# Patient Record
Sex: FEMALE | Race: WHITE | Employment: PART TIME | URBAN - METROPOLITAN AREA
[De-identification: names, ages, dates, MRNs, and addresses within clinical notes are randomized per-mention and may not be internally consistent; named-entity substitution may affect disease eponyms.]

---

## 2018-09-05 ENCOUNTER — OFFICE VISIT (OUTPATIENT)
Dept: URGENT CARE | Facility: URGENT CARE | Age: 18
End: 2018-09-05

## 2018-09-05 VITALS
TEMPERATURE: 97.7 F | HEART RATE: 76 BPM | DIASTOLIC BLOOD PRESSURE: 68 MMHG | SYSTOLIC BLOOD PRESSURE: 109 MMHG | WEIGHT: 148 LBS

## 2018-09-05 DIAGNOSIS — J02.0 STREP THROAT: Primary | ICD-10-CM

## 2018-09-05 DIAGNOSIS — R07.0 THROAT PAIN: ICD-10-CM

## 2018-09-05 LAB
DEPRECATED S PYO AG THROAT QL EIA: ABNORMAL
SPECIMEN SOURCE: ABNORMAL

## 2018-09-05 PROCEDURE — 99213 OFFICE O/P EST LOW 20 MIN: CPT | Performed by: PHYSICIAN ASSISTANT

## 2018-09-05 PROCEDURE — 87880 STREP A ASSAY W/OPTIC: CPT | Performed by: FAMILY MEDICINE

## 2018-09-05 RX ORDER — IBUPROFEN 200 MG
400 TABLET ORAL PRN
COMMUNITY

## 2018-09-05 RX ORDER — AMOXICILLIN 875 MG
875 TABLET ORAL 2 TIMES DAILY
Qty: 20 TABLET | Refills: 0 | Status: SHIPPED | OUTPATIENT
Start: 2018-09-05

## 2018-09-05 NOTE — PATIENT INSTRUCTIONS
Pharyngitis: Strep (Confirmed)    You have had a positive test for strep throat. Strep throat is a contagious illness. It is spread by coughing, kissing or by touching others after touching your mouth or nose. Symptoms include throat pain that is worse with swallowing, aching all over, headache, and fever. It is treated with antibiotic medicine. This should help you start to feel better in 1 to 2 days.  Home care    Rest at home. Drink plenty of fluids to you won't get dehydrated.    No work or school for the first 2 days of taking the antibiotics. After this time, you will not be contagious. You can then return to school or work if you are feeling better.     Take antibiotic medicine for the full 10 days, even if you feel better. This is very important to ensure the infection is treated. It is also important to prevent medicine-resistant germs from developing. If you were given an antibiotic shot, you don't need any more antibiotics.    You may use acetaminophen or ibuprofen to control pain or fever, unless another medicine was prescribed for this. Talk with your healthcare provider before taking these medicines if you have chronic liver or kidney disease. Also talk with your healthcare provider if you have had a stomach ulcer or GI bleeding.    Throat lozenges or sprays help reduce pain. Gargling with warm saltwater will also reduce throat pain. Dissolve 1/2 teaspoon of salt in 1 glass of warm water. This may be useful just before meals.     Soft foods are OK. Don't eat salty or spicy foods.  Follow-up care  Follow up with your healthcare provider or our staff if you don't get better over the next week.  When to seek medical advice  Call your healthcare provider right away if any of these occur:    Fever of 100.4 F (38 C) or higher, or as directed by your healthcare provider    New or worsening ear pain, sinus pain, or headache    Painful lumps in the back of neck    Stiff neck    Lymph nodes getting larger or  becoming soft in the middle    You can't swallow liquids or you can't open your mouth wide because of throat pain    Signs of dehydration. These include very dark urine or no urine, sunken eyes, and dizziness.    Trouble breathing or noisy breathing    Muffled voice    Rash  Prevention  Here are steps you can take to help prevent an infection:    Keep good hand washing habits.    Don t have close contact with people who have sore throats, colds, or other upper respiratory infections.    Don t smoke, and stay away from secondhand smoke.  Date Last Reviewed: 11/1/2017 2000-2017 [employer]. [address] [place] [address]. All rights reserved. This information is not intended as a substitute for professional medical care. Always follow your healthcare professional's instructions.

## 2018-09-05 NOTE — MR AVS SNAPSHOT
After Visit Summary   9/5/2018    Beba Melendez    MRN: [id number]           Patient Information     Date Of Birth          2000        Visit Information        Provider Department      9/5/2018 10:05 AM Genie Holloway PA-C [facility]        Today's Diagnoses     Strep throat    -  1    Throat pain          Care Instructions      Pharyngitis: Strep (Confirmed)    You have had a positive test for strep throat. Strep throat is a contagious illness. It is spread by coughing, kissing or by touching others after touching your mouth or nose. Symptoms include throat pain that is worse with swallowing, aching all over, headache, and fever. It is treated with antibiotic medicine. This should help you start to feel better in 1 to 2 days.  Home care    Rest at home. Drink plenty of fluids to you won't get dehydrated.    No work or school for the first 2 days of taking the antibiotics. After this time, you will not be contagious. You can then return to school or work if you are feeling better.     Take antibiotic medicine for the full 10 days, even if you feel better. This is very important to ensure the infection is treated. It is also important to prevent medicine-resistant germs from developing. If you were given an antibiotic shot, you don't need any more antibiotics.    You may use acetaminophen or ibuprofen to control pain or fever, unless another medicine was prescribed for this. Talk with your healthcare provider before taking these medicines if you have chronic liver or kidney disease. Also talk with your healthcare provider if you have had a stomach ulcer or GI bleeding.    Throat lozenges or sprays help reduce pain. Gargling with warm saltwater will also reduce throat pain. Dissolve 1/2 teaspoon of salt in 1 glass of warm water. This may be useful just before meals.     Soft foods are OK. Don't eat salty or spicy foods.  Follow-up care  Follow up with  your healthcare provider or our staff if you don't get better over the next week.  When to seek medical advice  Call your healthcare provider right away if any of these occur:    Fever of 100.4 F (38 C) or higher, or as directed by your healthcare provider    New or worsening ear pain, sinus pain, or headache    Painful lumps in the back of neck    Stiff neck    Lymph nodes getting larger or becoming soft in the middle    You can't swallow liquids or you can't open your mouth wide because of throat pain    Signs of dehydration. These include very dark urine or no urine, sunken eyes, and dizziness.    Trouble breathing or noisy breathing    Muffled voice    Rash  Prevention  Here are steps you can take to help prevent an infection:    Keep good hand washing habits.    Don t have close contact with people who have sore throats, colds, or other upper respiratory infections.    Don t smoke, and stay away from secondhand smoke.  Date Last Reviewed: 11/1/2017 2000-2017 [employer]. [address] [place] [address]. All rights reserved. This information is not intended as a substitute for professional medical care. Always follow your healthcare professional's instructions.                Follow-ups after your visit        Who to contact     If you have questions or need follow up information about today's clinic visit or your schedule please contact [facility] directly at [phone].  Normal or non-critical lab and imaging results will be communicated to you by MyChart, letter or phone within 4 business days after the clinic has received the results. If you do not hear from us within 7 days, please contact the clinic through MyChart or phone. If you have a critical or abnormal lab result, we will notify you by phone as soon as possible.  Submit refill requests through [employer] or call your pharmacy and they will forward the refill request to us. Please allow 3  "business days for your refill to be completed.          Additional Information About Your Visit        [employer] Information     [employer]-Blink lets you send messages to your doctor, view your test results, renew your prescriptions, schedule appointments and more. To sign up, go to [email or web address] . Click on \"Log in\" on the left side of the screen, which will take you to the Welcome page. Then click on \"Sign up Now\" on the right side of the page.     You will be asked to enter the access code listed below, as well as some personal information. Please follow the directions to create your username and password.     Your access code is: [email or web address]  Expires: 2018 11:18 AM     Your access code will  in 90 days. If you need help or a new code, please call your [facility] clinic or [phone].        Care EveryWhere ID     This is your Care EveryWhere ID. This could be used by other organizations to access your [facility] medical records  [id number]        Your Vitals Were     Pulse Temperature                76 97.7  F (36.5  C) (Oral)           Blood Pressure from Last 3 Encounters:   18 109/68   16 130/70   14 111/68    Weight from Last 3 Encounters:   18 148 lb (67.1 kg)   16 134 lb (60.8 kg) (67 %)*   14 135 lb 2.3 oz (61.3 kg) (76 %)*     * Growth percentiles are based on [facility] 2-20 Years data.              We Performed the Following     Strep, Rapid Screen          Today's Medication Changes          These changes are accurate as of 18 11:18 AM.  If you have any questions, ask your nurse or doctor.               Start taking these medicines.        Dose/Directions    amoxicillin 875 MG tablet   Commonly known as:  AMOXIL   Used for:  Strep throat   Started by:  Genie Holloway PA-C        Dose:  875 mg   Take 1 tablet (875 mg) by mouth 2 times daily   Quantity:  20 tablet   Refills:  0         These medicines have changed or have updated prescriptions.     "    Dose/Directions    ibuprofen 200 MG tablet   Commonly known as:  ADVIL/MOTRIN   This may have changed:  Another medication with the same name was removed. Continue taking this medication, and follow the directions you see here.   Changed by:  Genie Holloway PA-C        Dose:  400 mg   Take 400 mg by mouth as needed for mild pain   Refills:  0         Stop taking these medicines if you haven't already. Please contact your care team if you have questions.     cyclobenzaprine 5 MG tablet   Commonly known as:  FLEXERIL   Stopped by:  Genie Holloway PA-C           TYLENOL 8 HOUR PO   Stopped by:  Genie Holloway PA-C                Where to get your medicines      These medications were sent to [facility] [id number] [place] [address] [address] AT [facility] [address]  [address], [facility] [address]     Phone:  [phone]     amoxicillin 875 MG tablet                Primary Care Provider Fax #    Physician No Ref-Primary [phone]       No address on file        Equal Access to Services     [facility]: Hadii cintia ku hadasho Somariya, waaxda luqadaha, qaybta kaalmada adeegyadeborah, alyssa segura . So [facility] [phone].    ATENCIÓN: Si habla español, tiene a hendrix disposición servicios gratuitos de asistencia lingüística. Llame al [phone].    We comply with applicable federal civil rights laws and [place] laws. We do not discriminate on the basis of race, color, national origin, age, disability, sex, sexual orientation, or gender identity.            Thank you!     Thank you for choosing [facility]  for your care. Our goal is always to provide you with excellent care. Hearing back from our patients is one way we can continue to improve our services. Please take a few minutes to complete the written survey that you may receive in the mail after your visit with us. Thank you!             Your  Updated Medication List - Protect others around you: Learn how to safely use, store and throw away your medicines at www.disposemymeds.org.          This list is accurate as of 9/5/18 11:18 AM.  Always use your most recent med list.                   Brand Name Dispense Instructions for use Diagnosis    amoxicillin 875 MG tablet    AMOXIL    20 tablet    Take 1 tablet (875 mg) by mouth 2 times daily    Strep throat       ibuprofen 200 MG tablet    ADVIL/MOTRIN     Take 400 mg by mouth as needed for mild pain

## 2018-09-05 NOTE — LETTER
[facility]  [address] [address]  [phone]      September 5, 2018    RE:  Beba Melendez                                                                                                                                                       [address]  [facility] [place] [facility] [address]            To whom it may concern:    Beba Melendez was seen in clinic today and was diagnosed with Strep Throat.  She is considered contagious for the first 24 hours of the antibiotic.          Sincerely,        Genie Gr [facility]

## 2018-09-05 NOTE — PROGRESS NOTES
SUBJECTIVE:   Beba Melendez is a 18 year old female presenting with a chief complaint of   1) sore throat x 2 days  2) fever since yesterday up to 102.  Took ibuprofen prior to arrival in clinic today.   3) ear pain since yesterday.     Onset of symptoms was as above.  Course of illness is worsening.    Severity moderate  Current and Associated symptoms: as per HPI  Treatment measures tried include ibuprofen.  Predisposing factors include None.    No past medical history on file.     Denies any significant PMH    There is no problem list on file for this patient.    Social History   Substance Use Topics     Smoking status: Never Smoker     Smokeless tobacco: Never Used     Alcohol use Not on file       ROS:  CONSTITUTIONAL:as per HPI  INTEGUMENTARY/SKIN: NEGATIVE for worrisome rashes, moles or lesions  EYES: NEGATIVE for vision changes or irritation  ENT/MOUTH: as per HPI  RESP:NEGATIVE for significant cough or SOB  CV: NEGATIVE for chest pain, palpitations or peripheral edema  GI: NEGATIVE for nausea, abdominal pain, heartburn, or change in bowel habits  MUSCULOSKELETAL: NEGATIVE for significant arthralgias or myalgia  NEURO: NEGATIVE for weakness, dizziness or paresthesias  Review of systems negative except as stated above.    OBJECTIVE  :/68  Pulse 76  Temp 97.7  F (36.5  C) (Oral)  Wt 148 lb (67.1 kg)  GENERAL APPEARANCE: healthy, alert and no distress  EYES: EOMI,  PERRL, conjunctiva clear  HENT: ear canals and TM's normal.  Nose and mouth without ulcers, erythema or lesions  NECK: supple, nontender, no lymphadenopathy  RESP: lungs clear to auscultation - no rales, rhonchi or wheezes  CV: regular rates and rhythm, normal S1 S2, no murmur noted  ABDOMEN:  soft, nontender, no HSM or masses and bowel sounds normal  NEURO: Normal strength and tone, sensory exam grossly normal,  normal speech and mentation  SKIN: no suspicious lesions or rashes    (J02.0) Strep throat  (primary encounter  diagnosis)  Comment:   Plan: amoxicillin (AMOXIL) 875 MG tablet        Tylenol or ibuprofen prn.  Considered contagious for the first 24 hours of antibiotic.      (R07.0) Throat pain  Comment:   Plan: Strep, Rapid Screen

## 2019-04-17 ENCOUNTER — HOSPITAL ENCOUNTER (EMERGENCY)
Facility: CLINIC | Age: 19
Discharge: HOME | End: 2019-04-18
Attending: EMERGENCY MEDICINE | Admitting: EMERGENCY MEDICINE
Payer: OTHER MISCELLANEOUS

## 2019-04-17 VITALS
BODY MASS INDEX: 24.99 KG/M2 | SYSTOLIC BLOOD PRESSURE: 134 MMHG | WEIGHT: 150 LBS | OXYGEN SATURATION: 100 % | DIASTOLIC BLOOD PRESSURE: 89 MMHG | TEMPERATURE: 97.7 F | RESPIRATION RATE: 16 BRPM | HEIGHT: 65 IN

## 2019-04-17 DIAGNOSIS — S61.011A LACERATION OF RIGHT THUMB WITHOUT FOREIGN BODY WITHOUT DAMAGE TO NAIL, INITIAL ENCOUNTER: ICD-10-CM

## 2019-04-17 PROCEDURE — 90471 IMMUNIZATION ADMIN: CPT

## 2019-04-17 PROCEDURE — 12001 RPR S/N/AX/GEN/TRNK 2.5CM/<: CPT

## 2019-04-17 PROCEDURE — 99283 EMERGENCY DEPT VISIT LOW MDM: CPT | Mod: 25

## 2019-04-17 ASSESSMENT — MIFFLIN-ST. JEOR: SCORE: 1451.28

## 2019-04-17 NOTE — ED AVS SNAPSHOT
Emergency Department  [address] [place] [address]  Phone:  [phone]  Fax:  [phone]                                    Beba Melendez   MRN: [id number]    Department:   Emergency Department   Date of Visit:  4/17/2019           After Visit Summary Signature Page    I have received my discharge instructions, and my questions have been answered. I have discussed any challenges I see with this plan with the nurse or doctor.    ..........................................................................................................................................  Patient/Patient Representative Signature      ..........................................................................................................................................  Patient Representative Print Name and Relationship to Patient    ..................................................               ................................................  Date                                   Time    ..........................................................................................................................................  Reviewed by Signature/Title    ...................................................              ..............................................  Date                                               Time          22EPIC Rev 08/18

## 2019-04-18 PROCEDURE — 25000128 H RX IP 250 OP 636: Performed by: EMERGENCY MEDICINE

## 2019-04-18 PROCEDURE — 90715 TDAP VACCINE 7 YRS/> IM: CPT | Performed by: EMERGENCY MEDICINE

## 2019-04-18 RX ADMIN — CLOSTRIDIUM TETANI TOXOID ANTIGEN (FORMALDEHYDE INACTIVATED), CORYNEBACTERIUM DIPHTHERIAE TOXOID ANTIGEN (FORMALDEHYDE INACTIVATED), BORDETELLA PERTUSSIS TOXOID ANTIGEN (GLUTARALDEHYDE INACTIVATED), BORDETELLA PERTUSSIS FILAMENTOUS HEMAGGLUTININ ANTIGEN (FORMALDEHYDE INACTIVATED), BORDETELLA PERTUSSIS PERTACTIN ANTIGEN, AND BORDETELLA PERTUSSIS FIMBRIAE 2/3 ANTIGEN 0.5 ML: 5; 2; 2.5; 5; 3; 5 INJECTION, SUSPENSION INTRAMUSCULAR at 00:06

## 2019-04-18 ASSESSMENT — ENCOUNTER SYMPTOMS
WOUND: 1
NUMBNESS: 0
WEAKNESS: 0

## 2019-04-18 NOTE — ED PROVIDER NOTES
"  History     Chief Complaint:  Laceration    HPI   Beba Melendez is a 18 year old female who presents with a laceration to the base of the right thumb. 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 She otherwise has no other concerns.    Allergies:  Claritin     Medications:    None     Past Medical History:   No past medical history on file.    Past Surgical History:    No past surgical history on file.    Family History:    No pertinent history     Social History:  The patient  reports that she has never smoked. She has never used smokeless tobacco.   Marital Status:  Single [1]     Review of Systems   Skin: Positive for wound.   Neurological: Negative for weakness and numbness.   All other systems reviewed and are negative.        Physical Exam     Vital signs  Patient Vitals for the past 24 hrs:   BP Temp Temp src Heart Rate Resp SpO2 Height Weight   04/17/19 2346 134/89 97.7  F (36.5  C) Temporal 74 16 100 % 1.651 m (5' 5\") 68 kg (150 lb)          Physical Exam  General: Appears well-developed and well-nourished.   Head: No signs of trauma.   CV: Normal rate  Resp: Effort normal. No respiratory distress.   MSK: Normal range of motion.   Neuro: The patient is alert and oriented.    GCS 15  Skin: Skin is warm and dry. 1cm laceration at base of right thumb.  Superficial, no involvement of never, vessel, or tendon.  No foreign bodies.  +neurovascularly intact distally.  Psych: normal mood and affect. behavior is normal.       Emergency Department Course   Procedures:    Narrative: Procedure: Laceration Repair        LACERATION:  A simple clean 1 cm laceration.      LOCATION:  Base of right thumb       FUNCTION:  Distally sensation, circulation, motor and tendon function are intact.      ANESTHESIA:  LET - Topical      PREPARATION:  Irrigation and Scrubbing with Shur " Yue      DEBRIDEMENT:  no debridement      CLOSURE:  Wound was closed with One Layer.  Skin closed with 3 x 5.0 Vicryl Rapid Sutures using interrupted sutures.    Emergency Department Course:  Past medical records, nursing notes, and vitals reviewed.  2354: I performed an exam of the patient and obtained history, as documented above.       0120:Rechecked the patient, findings and plan explained to the patient. Patient discharged home, status improved, with instructions regarding supportive care, medications, and reasons to return as well as the importance of close follow-up was reviewed.    Impression & Plan    Medical Decision Making:  Beba Melendez is a 18 year old female  who presents due to a laceration to the base of her right thumb.  She accidentally cut it on the piece of glass at work.  On my evaluation, she had a fairly superficial 1 cm laceration.  There are no foreign bodies and no signs of injury to tendons, nerves, or vessels.  Wound was thoroughly cleaned and sutured closed with good result.  Patient was instructed to monitor for any signs of infection and return for any further concerns.      Diagnosis:    ICD-10-CM    1. Laceration of right thumb without foreign body without damage to nail, initial encounter S61.011A        Disposition:  discharged to home    Yousif SINCLAIR, am serving as a scribe at 11:57 PM on 4/17/2019 to document services personally performed by Bulmaro Ferrer MD based on my observations and the provider's statements to me.     EMERGENCY DEPARTMENT       Bulmaro Ferrer MD  04/18/19 0563

## 2019-09-19 ENCOUNTER — OFFICE VISIT (OUTPATIENT)
Dept: URGENT CARE | Facility: URGENT CARE | Age: 19
End: 2019-09-19

## 2019-09-19 VITALS
TEMPERATURE: 98.1 F | DIASTOLIC BLOOD PRESSURE: 65 MMHG | HEIGHT: 65 IN | OXYGEN SATURATION: 98 % | HEART RATE: 57 BPM | BODY MASS INDEX: 25.84 KG/M2 | WEIGHT: 155.1 LBS | SYSTOLIC BLOOD PRESSURE: 102 MMHG

## 2019-09-19 DIAGNOSIS — R07.0 THROAT PAIN: ICD-10-CM

## 2019-09-19 DIAGNOSIS — R09.89 CHEST CONGESTION: ICD-10-CM

## 2019-09-19 DIAGNOSIS — R05.8 PRODUCTIVE COUGH: Primary | ICD-10-CM

## 2019-09-19 DIAGNOSIS — R05.9 COUGH: ICD-10-CM

## 2019-09-19 PROCEDURE — 99214 OFFICE O/P EST MOD 30 MIN: CPT | Performed by: PHYSICIAN ASSISTANT

## 2019-09-19 RX ORDER — AZITHROMYCIN 250 MG/1
TABLET, FILM COATED ORAL
Qty: 6 TABLET | Refills: 0 | Status: SHIPPED | OUTPATIENT
Start: 2019-09-19

## 2019-09-19 RX ORDER — DEXTROMETHORPHAN POLISTIREX 30 MG/5ML
30 SUSPENSION ORAL 2 TIMES DAILY
Qty: 148 ML | Refills: 0 | Status: SHIPPED | OUTPATIENT
Start: 2019-09-19

## 2019-09-19 ASSESSMENT — MIFFLIN-ST. JEOR: SCORE: 1469.41

## 2019-09-19 NOTE — PROGRESS NOTES
"SUBJECTIVE:   Beba Melendez is a 19 year old female presenting with a chief complaint of having productive cough, chest congestion and coughing.  Onset of symptoms was 6 day(s) ago.  Course of illness is worsening.    Severity moderate  Current and Associated symptoms: stuffy nose, cough - productive, sore throat and facial pain/pressure  Treatment measures tried include Decongestants.  Predisposing factors include recent illness .    PMH  Allergies     Allergies   Allergen Reactions     Claritin      Family Hx  Obesity  HTN  Allergies    Social History     Tobacco Use     Smoking status: Never Smoker     Smokeless tobacco: Never Used   Substance Use Topics     Alcohol use: Not on file       ROS:  CONSTITUTIONAL:NEGATIVE for fever, chills, change in weight  INTEGUMENTARY/SKIN: NEGATIVE for worrisome rashes, moles or lesions  EYES: NEGATIVE for vision changes or irritation  ENT/MOUTH: POSITIVE for sore throat and sinus congestion  RESP:POSITIVE for cough-productive  CV: NEGATIVE for chest pain, palpitations or peripheral edema  GI: NEGATIVE for nausea, abdominal pain, heartburn, or change in bowel habits  : negative for and dysuria  MUSCULOSKELETAL: NEGATIVE for significant arthralgias or myalgia  NEURO: NEGATIVE for weakness, dizziness or paresthesias    OBJECTIVE  :/65   Pulse 57   Temp 98.1  F (36.7  C) (Oral)   Ht 1.651 m (5' 5\")   Wt 70.4 kg (155 lb 1.6 oz)   SpO2 98%   BMI 25.81 kg/m    GENERAL APPEARANCE: healthy, alert and no distress  EYES: EOMI,  PERRL, conjunctiva clear  HENT: TM's normal bilaterally and nasal turbinates erythematous, swollen  NECK: supple, nontender, no lymphadenopathy  RESP: lungs clear to auscultation - no rales, rhonchi or wheezes  CV: regular rates and rhythm, normal S1 S2, no murmur noted  ABDOMEN:  soft, nontender, no HSM or masses and bowel sounds normal  NEURO: Normal strength and tone, sensory exam grossly normal,  normal speech and mentation  SKIN: no " suspicious lesions or rashes    ASSESSMENT/PLAN      ICD-10-CM    1. Productive cough R05 azithromycin (ZITHROMAX) 250 MG tablet   2. Chest congestion R09.89 azithromycin (ZITHROMAX) 250 MG tablet   3. Cough R05 dextromethorphan (DELSYM) 30 MG/5ML liquid   4. Throat pain R07.0        Orders Placed This Encounter     azithromycin (ZITHROMAX) 250 MG tablet     dextromethorphan (DELSYM) 30 MG/5ML liquid       Fluids, rest  OTC motrin  Follow up as needed if symptoms persist or worsen  See orders in Epic